# Patient Record
Sex: FEMALE | Race: OTHER | HISPANIC OR LATINO | Employment: UNEMPLOYED | URBAN - METROPOLITAN AREA
[De-identification: names, ages, dates, MRNs, and addresses within clinical notes are randomized per-mention and may not be internally consistent; named-entity substitution may affect disease eponyms.]

---

## 2024-11-17 ENCOUNTER — HOSPITAL ENCOUNTER (EMERGENCY)
Facility: HOSPITAL | Age: 34
Discharge: HOME | End: 2024-11-18
Attending: EMERGENCY MEDICINE
Payer: MEDICAID

## 2024-11-17 DIAGNOSIS — F41.9 ANXIETY: ICD-10-CM

## 2024-11-17 DIAGNOSIS — F32.A DEPRESSION, UNSPECIFIED DEPRESSION TYPE: Primary | ICD-10-CM

## 2024-11-17 PROCEDURE — 99284 EMERGENCY DEPT VISIT MOD MDM: CPT

## 2024-11-17 PROCEDURE — 99284 EMERGENCY DEPT VISIT MOD MDM: CPT | Performed by: EMERGENCY MEDICINE

## 2024-11-17 RX ORDER — HYDROXYZINE HYDROCHLORIDE 10 MG/1
10 TABLET, FILM COATED ORAL ONCE
Status: DISCONTINUED | OUTPATIENT
Start: 2024-11-17 | End: 2024-11-18

## 2024-11-17 SDOH — HEALTH STABILITY: MENTAL HEALTH: IN THE PAST WEEK, HAVE YOU BEEN HAVING THOUGHTS ABOUT KILLING YOURSELF?: NO

## 2024-11-17 SDOH — ECONOMIC STABILITY: HOUSING INSECURITY: FEELS SAFE LIVING IN HOME: YES

## 2024-11-17 SDOH — HEALTH STABILITY: MENTAL HEALTH: WISH TO BE DEAD (PAST 1 MONTH): NO

## 2024-11-17 SDOH — HEALTH STABILITY: MENTAL HEALTH: SUICIDAL BEHAVIOR (3 MONTHS): NO

## 2024-11-17 SDOH — HEALTH STABILITY: MENTAL HEALTH: IN THE PAST FEW WEEKS, HAVE YOU WISHED YOU WERE DEAD?: NO

## 2024-11-17 SDOH — HEALTH STABILITY: MENTAL HEALTH: HAVE YOU EVER TRIED TO KILL YOURSELF?: YES

## 2024-11-17 SDOH — HEALTH STABILITY: MENTAL HEALTH: DEPRESSION SYMPTOMS: NO PROBLEMS REPORTED OR OBSERVED.

## 2024-11-17 SDOH — HEALTH STABILITY: MENTAL HEALTH: IN THE PAST FEW WEEKS, HAVE YOU FELT THAT YOU OR YOUR FAMILY WOULD BE BETTER OFF IF YOU WERE DEAD?: NO

## 2024-11-17 SDOH — HEALTH STABILITY: MENTAL HEALTH: ARE YOU HAVING THOUGHTS OF KILLING YOURSELF RIGHT NOW?: NO

## 2024-11-17 SDOH — HEALTH STABILITY: MENTAL HEALTH: ANXIETY SYMPTOMS: GENERALIZED

## 2024-11-17 SDOH — HEALTH STABILITY: MENTAL HEALTH: SUICIDAL BEHAVIOR (LIFETIME): YES

## 2024-11-17 SDOH — HEALTH STABILITY: MENTAL HEALTH: NON-SPECIFIC ACTIVE SUICIDAL THOUGHTS (PAST 1 MONTH): NO

## 2024-11-17 ASSESSMENT — LIFESTYLE VARIABLES
TOTAL SCORE: 0
PRESCIPTION_ABUSE_PAST_12_MONTHS: NO
EVER FELT BAD OR GUILTY ABOUT YOUR DRINKING: NO
HAVE PEOPLE ANNOYED YOU BY CRITICIZING YOUR DRINKING: NO
HAVE YOU EVER FELT YOU SHOULD CUT DOWN ON YOUR DRINKING: NO
EVER HAD A DRINK FIRST THING IN THE MORNING TO STEADY YOUR NERVES TO GET RID OF A HANGOVER: NO
SUBSTANCE_ABUSE_PAST_12_MONTHS: NO

## 2024-11-17 ASSESSMENT — PAIN - FUNCTIONAL ASSESSMENT: PAIN_FUNCTIONAL_ASSESSMENT: 0-10

## 2024-11-17 ASSESSMENT — PAIN SCALES - GENERAL: PAINLEVEL_OUTOF10: 0 - NO PAIN

## 2024-11-18 VITALS
HEART RATE: 89 BPM | OXYGEN SATURATION: 97 % | SYSTOLIC BLOOD PRESSURE: 122 MMHG | RESPIRATION RATE: 20 BRPM | TEMPERATURE: 98.5 F | DIASTOLIC BLOOD PRESSURE: 79 MMHG

## 2024-11-18 PROCEDURE — 2500000001 HC RX 250 WO HCPCS SELF ADMINISTERED DRUGS (ALT 637 FOR MEDICARE OP): Mod: SE

## 2024-11-18 RX ORDER — HYDROXYZINE HYDROCHLORIDE 25 MG/1
12.5 TABLET, FILM COATED ORAL ONCE
Status: COMPLETED | OUTPATIENT
Start: 2024-11-18 | End: 2024-11-18

## 2024-11-18 RX ORDER — HYDROXYZINE HYDROCHLORIDE 10 MG/1
10 TABLET, FILM COATED ORAL EVERY 8 HOURS PRN
Qty: 9 TABLET | Refills: 0 | Status: SHIPPED | OUTPATIENT
Start: 2024-11-18 | End: 2024-11-21

## 2024-11-18 NOTE — ED PROVIDER NOTES
Emergency Department Provider Note        History of Present Illness     CC: No chief complaint on file.     HPI:  This is a 34-year-old female with a past medical history of depression anxiety who presents to the emergency department with worsening psychiatric symptoms.  Patient states over the past 3 or 4 days she has had more severe depression and anxiety, intrusive thoughts.  She denies any suicidal ideation or thoughts of harming herself.  She denies any thoughts of harming other people.  She denies any hallucinations.  States that she feels she is reaching a plateau on her 10 mg of Lexapro and she is no longer getting an affect.  States that she knows her triggers and wanted to come talk with somebody and be evaluated prior to needing more intensive psychiatric intervention.  States that she is living at a family shelter right now, has a  and the shelter manager,  and herself all felt that coming to the hospital for an evaluation was in her best interest and best course of action.  Denies any medical symptoms at this time.  Has been eating and drinking well with no nausea or vomiting.  No fevers or recent illnesses.  No other acute symptoms at this time.    Limitations to history: None  Independent historian(s): None   Records Reviewed: Recent available ED and inpatient notes reviewed in EMR.    PMHx/PSHx:  Per HPI.   - has no past medical history on file.  - has no past surgical history on file.    Medications:  Reviewed in EMR. See EMR for complete list of medications and doses.    Allergies:  Patient has no allergy information on record.    Social History:  - Tobacco:  has no history on file for tobacco use.   - Alcohol:  has no history on file for alcohol use.   - Illicit Drugs:  has no history on file for drug use.     ROS:  Per HPI.       Physical Exam     Triage Vitals:  T 36.3 °C (97.3 °F)    /70  RR 18  O2 96 %      General: Patient resting comfortably, no acute  distress, overall well appearing, and appropriately conversational.   Head: Normocephalic. Atraumatic.  Neck: FROM. No gross masses.   Eyes: EOMI. Conjunctiva clear.   ENT: Moist mucous membranes, no apparent trauma or lesions.  CV: Regular rate. 2+ radial pulses bilaterally.  Resp: No respiratory distress, breathing easily. Speaks in full sentences.    GI: Soft, non-distended. No tenderness with palpation.  EXT: No peripheral edema, contusions, or wounds.   Skin: Warm and dry, no rashes or lesions.  Neuro: Alert. No focal neurological deficits. Motor and sensation intact throughout. Speech fluent. Normal gait.   Psych: Emotional.  Appropriate mood and behavior, converses and responds appropriately.      Medical Decision Making & ED Course     Labs:   Labs Reviewed - No data to display     Imaging:   No orders to display        EKG:  None    MDM:  This is a 34-year-old female who presents to the emergency department to speak with a psychiatric social worker for intrusive thoughts, depression and anxiety worsening over the past 3 to 4 days.  She is hemodynamically stable and in no distress.  Her vital signs are within normal limits.  Her physical exam is unremarkable.  She denies suicidal ideation, homicidal ideation, and hallucinations.  She will not meet criteria for inpatient psychiatric hospitalization.  She is not displaying any manic behavior.  She is not acutely psychotic or decompensated from an undiagnosed psychiatric illness.  She has no medical symptoms or complaints as well as no medical history.  We will contact [facility] to have someone speak with her regarding resources and her mental health.  We will trial some hydroxyzine here in the emergency department and can provide a couple of days worth to help her get through to her next appointment with her psychiatric provider on Wednesday through [facility].  Patient feels comfortable with this plan at this time.  EPAT evaluated the patient, discussed  resources.  Patient does not meet criteria for inpatient psychiatric hospitalization.  I reaffirmed the plan with the patient.  Will provide a prescription for few days worth of hydroxyzine she may take to help with anxiety for the next few days until she meets with her provider at [facility].  She again expresses understanding, agrees with this plan.  She remains hemodynamically stable and is discharged home.      ED Course:  Diagnoses as of 11/19/24 2153   Depression, unspecified depression type   Anxiety       Independent Result Review and Interpretation: Relevant laboratory and radiographic results were reviewed and independently interpreted by myself.  As necessary, they are commented on in the ED Course.    Social Determinants Limiting Care:  Mental health issues      Patient seen by and discussed with the attending emergency medicine physician.       Disposition    Discharge    Kirit Thomas DO   Emergency Medicine PGY-3  [facility]      Procedures      Procedures ? SmartLinks last updated 11/17/2024 8:29 PM          Kirit Thomas,   Resident  11/19/24 2159       Kirit Thomas,   Resident  11/19/24 2200

## 2024-11-18 NOTE — DISCHARGE INSTRUCTIONS
I provided a prescription for hydroxyzine which you may take for anxiety symptoms over the next few days.  Please continue to follow-up with your provider at [facility].  Return to the emergency department for any new or worsening symptoms or for any other concerns, especially thoughts of hurting yourself or others or for new hallucinations.

## 2024-11-18 NOTE — ED TRIAGE NOTES
"Pt presents from home with child for psych evaluation; pt denies HI/SI, but has \"wandering thoughts\", pt states that she takes 10 mg of lexapro, and feels as if triggers are occurring enhancing mental state   "

## 2024-11-18 NOTE — PROGRESS NOTES
EPAT - Social Work Psychiatric Assessment    Arrival Details  Mode of Arrival: Ambulatory  Admission Source: Home  Admission Type: Voluntary  EPAT Assessment Start Date: 11/17/24  EPAT Assessment Start Time: 2315  Name of : Mohit Forresteen    History of Present Illness  Admission Reason: Anxiety  HPI: Patient is a 34 year old female who presented to the ED for increased anxiety. The patient reports her anxiety worsening and feels her medications are no longer working. A review of her Triage, Provider and Arthur was conducted.    SW Readmission Information   Readmission within 30 Days: No    Psychiatric Symptoms  Anxiety Symptoms: Generalized  Depression Symptoms: No problems reported or observed.  Jesika Symptoms: No problems reported or observed.    Psychosis Symptoms  Hallucination Type: No problems reported or observed.  Delusion Type: No problems reported or observed.    Additional Symptoms - Adult  Generalized Anxiety Disorder: Excessive anxiety/worry  Obsessive Compulsive Disorder: No problems reported or observed.  Panic Attack: No problems reported or observed.  Post Traumatic Stress Disorder: No problems reported or observed.  Delirium: No problems reported or observed.  Review of Symptoms Comments: Please see above    Past Psychiatric History/Meds/Treatments  Past Psychiatric History: Patient has a history of Anxiety. She has an intake with [facility] on Tuesday 11/19 for Psychiatry. She currently takes her medications as prescribed. She does have past suicide attempts, the last was over 5 years ago. She denies any substance treatment history.  Past Psychiatric Meds/Treatments: see med list. Patient is compliant  Past Violence/Victimization History: none    Current Mental Health Contacts   Name/Phone Number: [facility]   Last Appointment Date: 11/19  Provider Name/Phone Number: [facility]  Provider Last Appointment Date: 11/19    Support System: Immediate  family    Living Arrangement: Shelter    Home Safety  Feels Safe Living in Home: Yes         [place] Service/Education History  Current or Previous  Service: None  Education Level: GED  History of Learning Problems: No  History of School Behavior Problems: No  School History: see above    Social/Cultural History  Social History: Patient is her own guardian  Important Activities: Family    Legal  Legal Concerns: none    Drug Screening  Have you used any substances (canabis, cocaine, heroin, hallucinogens, inhalants, etc.) in the past 12 months?: No  Have you used any prescription drugs other than prescribed in the past 12 months?: No  Is a toxicology screen needed?: No    Stage of Change  Stage of Change: Precontemplation    Behavioral Health  Behavioral Health(WDL): Exceptions to WDL  Behaviors/Mood: Anxious, Cooperative  Affect: Appropriate to circumstances  Parent/Guardian/Significant Other Involvement: No involvement    Orientation  Orientation Level: Oriented X4    General Appearance  Motor Activity: Unremarkable  Speech Pattern: Other (Comment)  General Attitude: Cooperative  Appearance/Hygiene: Unremarkable    Thought Process  Coherency: Other (Comment)  Content: Unremarkable  Delusions: Other (Comment)  Perception: Not altered  Hallucination: None  Judgment/Insight: Limited  Confusion: None  Cognition: Appropriate safety awareness, Appropriate attention/concentration, Appropriate for developmental age, Follows commands    Sleep Pattern  Sleep Pattern: Sleeps all night    Risk Factors  Self Harm/Suicidal Ideation Plan: Patient denies  Previous Self Harm/Suicidal Plans: past attempt over five years ago.  Risk Factors: None    Violence Risk Assessment  Assessment of Violence: None noted  Thoughts of Harm to Others: No    Ability to Assess Risk Screen  Risk Screen - Ability to Assess: Able to be screened  Ask Suicide-Screening Questions  1. In the past few weeks, have you wished you were dead?: No  2. In  the past few weeks, have you felt that you or your family would be better off if you were dead?: No  3. In the past week, have you been having thoughts about killing yourself?: No  4. Have you ever tried to kill yourself?: Yes  5. Are you having thoughts of killing yourself right now?: No  Calculated Risk Score: Potential Risk  [place] Suicide Severity Rating Scale (Screener/Recent Self-Report)  1. Wish to be Dead (Past 1 Month): No  2. Non-Specific Active Suicidal Thoughts (Past 1 Month): No  6. Suicidal Behavior (Lifetime): Yes  6. Suicidal Behavior (3 Months): No  Calculated C-SSRS Risk Score (Lifetime/Recent): Moderate Risk  Step 1: Risk Factors  Current & Past Psychiatric Dx: Mood disorder  Presenting Symptoms: Anxiety and/or panic  Family History: Other (Comment)  Precipitants/Stressors: Triggering events leading to humiliation, shame, and/or despair (e.g. loss of relationship, financial or health status) (real or anticipated)  Change in Treatment: Non-compliant or not receiving treatment  Access to Lethal Methods : No  Step 2: Protective Factors   Protective Factors Internal: Identifies reasons for living, Ability to cope with stress  Protective Factors External: Cultural, spiritual and/or moral attitudes against suicide, Supportive social network or family or friends, Positive therapeutic relationships, Responsibility to children  Step 3: Suicidal Ideation Intensity  How Many Times Have You Had These Thoughts: Less than once a week  When You Have the Thoughts How Long do They Last : Fleeting - few seconds or minutes  Could/Can You Stop Thinking About Killing Yourself or Wanting to Die if You Want to: Does not attempt to control thoughts  Are There Things - Anyone or Anything - That Stopped You From Wanting to Die or Acting on: Does not apply  What Sort of Reasons Did You Have For Thinking About Wanting to Die or Killing Yourself: Does not apply  Total Score: 2  Step 5: Documentation  Risk Level: Low suicide  "risk, Moderate suicide risk (Patient is low to moderate risk. Dr. Hudson agrees.)    Psychiatric Impression and Plan of Care  Assessment and Plan: Patient is a 34 year old female with a history of Anxiety. The patient recently moved from [place] to [place] after living there for 5 years. The patient and her two year old came to live with her sister. She shared this was stressful and she and her child have been living in a shelter \"unitlI can get settled\". She shared her anxiety has increased and she feels her Lexapro is no longer working. She has an intake at [facility] on 11/19/24. The patient is future oriented and help seeking. She has good coping skills. She is low -moderate risk for self harm. She admits to a past suicide attempt 5 years ago. She denies any current suicidal or homicidal ideation as well as hallucinations.  Specific Resources Provided to Patient: [facility]  CM Notified: no  PHP/IOP Recommended: none  Specific Information Provided for PHP/IOP: none  Plan Comments: discharge    Outcome/Disposition  Patient's Perception of Outcome Achieved: patient is help seeking  Assessment, Recommendations and Risk Level Reviewed with: discharge  Contact Name: none  Contact Number(s): none  Contact Relationship: none  EPAT Assessment Completed Date: 11/18/24  EPAT Assessment Completed Time: 0035  Patient Disposition: Home      "

## 2025-02-07 ENCOUNTER — APPOINTMENT (OUTPATIENT)
Dept: OPHTHALMOLOGY | Facility: CLINIC | Age: 35
End: 2025-02-07
Payer: MEDICAID